# Patient Record
Sex: MALE | Race: WHITE | URBAN - METROPOLITAN AREA
[De-identification: names, ages, dates, MRNs, and addresses within clinical notes are randomized per-mention and may not be internally consistent; named-entity substitution may affect disease eponyms.]

---

## 2017-01-10 ENCOUNTER — OFFICE VISIT (OUTPATIENT)
Dept: INTERNAL MEDICINE CLINIC | Age: 42
End: 2017-01-10

## 2017-01-10 VITALS
SYSTOLIC BLOOD PRESSURE: 112 MMHG | RESPIRATION RATE: 12 BRPM | TEMPERATURE: 98.5 F | HEIGHT: 67 IN | WEIGHT: 163.6 LBS | OXYGEN SATURATION: 98 % | BODY MASS INDEX: 25.68 KG/M2 | HEART RATE: 80 BPM | DIASTOLIC BLOOD PRESSURE: 84 MMHG

## 2017-01-10 DIAGNOSIS — B02.9 HERPES ZOSTER WITHOUT COMPLICATION: Primary | ICD-10-CM

## 2017-01-10 RX ORDER — DICLOFENAC SODIUM 75 MG/1
75 TABLET, DELAYED RELEASE ORAL 2 TIMES DAILY
Qty: 60 TAB | Refills: 0 | Status: SHIPPED | OUTPATIENT
Start: 2017-01-10 | End: 2017-08-10 | Stop reason: SDUPTHER

## 2017-01-10 RX ORDER — VALACYCLOVIR HYDROCHLORIDE 1 G/1
1000 TABLET, FILM COATED ORAL 3 TIMES DAILY
Qty: 21 TAB | Refills: 0 | Status: SHIPPED | OUTPATIENT
Start: 2017-01-10 | End: 2017-01-17

## 2017-01-10 NOTE — PATIENT INSTRUCTIONS

## 2017-01-10 NOTE — PROGRESS NOTES
Krystle Levi is a 35 y.o. male who was seen in clinic today (1/10/2017). Assessment & Plan:  Esteban Lamb was seen today for rash. Diagnoses and all orders for this visit:    Herpes zoster without complication- presumptive dx, symptoms sound good, reviewed expectations. Will hold off on steroids & use NSAIDs. Red flags and expectations were reviewed & discussed with the him. He verbalized understanding.   -     valACYclovir (VALTREX) 1 gram tablet; Take 1 Tab by mouth three (3) times daily for 7 days. -     diclofenac EC (VOLTAREN) 75 mg EC tablet; Take 1 Tab by mouth two (2) times a day. Follow-up Disposition:  Return if symptoms worsen or fail to improve.       ----------------------------------------------------------------------    Subjective:  Dermatology Review  He is here to talk about pain. He noticed it 2 days ago, with gradually worsening since that time. Location: right mid/lower back and now on the R lower abdomen. Symptoms include burning & aching & tender to the touch. He denies: no trauma. He reports steroids injections for back pain in November. Treatment to date has included Diclofenac w/ some relief. Prior to Admission medications    Medication Sig Start Date End Date Taking? Authorizing Provider   fluticasone (FLONASE) 50 mcg/actuation nasal spray 2 Sprays by Both Nostrils route daily as needed for Rhinitis. Yes Historical Provider   irbesartan (AVAPRO) 150 mg tablet TAKE 1 TABLET NIGHTLY 4/19/16  Yes Evita Rhodes MD   levothyroxine (SYNTHROID) 75 mcg tablet Take 1 Tab by mouth Daily (before breakfast). 4/19/16  Yes Evita Rhodes MD   OMEPRAZOLE PO Take  by mouth daily as needed. Yes Historical Provider   Cetirizine (ZYRTEC) 10 mg Cap Take  by mouth daily as needed.    Yes Historical Provider          Allergies   Allergen Reactions    Pcn [Penicillins] Swelling           ROS- no n/v or diarrhea/constipation or fevers/chills      Objective:   Physical Exam   Abdominal: There is tenderness. Musculoskeletal:        Lumbar back: He exhibits tenderness. He exhibits no bony tenderness and no deformity. Back:          Visit Vitals    /84    Pulse 80    Temp 98.5 °F (36.9 °C) (Oral)    Resp 12    Ht 5' 7\" (1.702 m)    Wt 163 lb 9.6 oz (74.2 kg)    SpO2 98%    BMI 25.62 kg/m2         Disclaimer:  Advised him to call back or return to office if symptoms worsen/change/persist.  Discussed expected course/resolution/complications of diagnosis in detail with patient. Medication risks/benefits/costs/interactions/alternatives discussed with patient. He was given an after visit summary which includes diagnoses, current medications, & vitals. He expressed understanding with. the diagnosis and plan.         Aleksandr Matson MD

## 2017-01-10 NOTE — PROGRESS NOTES
2 nights ago developed pain right side of back radiating to the front. Noticed small rash developing last night. Worried about shingles.

## 2017-04-03 DIAGNOSIS — E06.3 ACQUIRED AUTOIMMUNE HYPOTHYROIDISM: ICD-10-CM

## 2017-04-03 RX ORDER — LEVOTHYROXINE SODIUM 75 UG/1
TABLET ORAL
Qty: 90 TAB | Refills: 0 | Status: SHIPPED | COMMUNITY
Start: 2017-04-03 | End: 2018-07-30 | Stop reason: SDUPTHER

## 2017-05-10 DIAGNOSIS — I10 ESSENTIAL HYPERTENSION, BENIGN: ICD-10-CM

## 2017-05-10 NOTE — TELEPHONE ENCOUNTER
Left detailed v/m for pt to return call, hes on the schedule next week to see Dr. Kale Woods, not sure if he is switching to him for his pcp.

## 2017-05-11 RX ORDER — IRBESARTAN 150 MG/1
TABLET ORAL
Qty: 90 TAB | Refills: 0 | Status: SHIPPED | COMMUNITY
Start: 2017-05-11 | End: 2017-05-15 | Stop reason: SDUPTHER

## 2017-05-11 NOTE — TELEPHONE ENCOUNTER
Called pt there was a error in the schedule pt now on schedule to see Dr. Gayle Castro next week on 5/15/17. Refill approved. Pt is aware.

## 2017-05-15 ENCOUNTER — OFFICE VISIT (OUTPATIENT)
Dept: INTERNAL MEDICINE CLINIC | Age: 42
End: 2017-05-15

## 2017-05-15 VITALS
RESPIRATION RATE: 16 BRPM | HEIGHT: 67 IN | WEIGHT: 161.6 LBS | SYSTOLIC BLOOD PRESSURE: 132 MMHG | DIASTOLIC BLOOD PRESSURE: 88 MMHG | OXYGEN SATURATION: 94 % | HEART RATE: 74 BPM | TEMPERATURE: 98.5 F | BODY MASS INDEX: 25.36 KG/M2

## 2017-05-15 DIAGNOSIS — I10 ESSENTIAL HYPERTENSION, BENIGN: Primary | ICD-10-CM

## 2017-05-15 DIAGNOSIS — E06.3 ACQUIRED AUTOIMMUNE HYPOTHYROIDISM: ICD-10-CM

## 2017-05-15 DIAGNOSIS — Z23 NEED FOR TDAP VACCINATION: ICD-10-CM

## 2017-05-15 DIAGNOSIS — K58.0 IRRITABLE BOWEL SYNDROME WITH DIARRHEA: ICD-10-CM

## 2017-05-15 DIAGNOSIS — E78.2 MIXED HYPERLIPIDEMIA: ICD-10-CM

## 2017-05-15 DIAGNOSIS — G89.29 CHRONIC LOW BACK PAIN WITHOUT SCIATICA, UNSPECIFIED BACK PAIN LATERALITY: ICD-10-CM

## 2017-05-15 DIAGNOSIS — M54.50 CHRONIC LOW BACK PAIN WITHOUT SCIATICA, UNSPECIFIED BACK PAIN LATERALITY: ICD-10-CM

## 2017-05-15 LAB
BILIRUB UR QL STRIP: NEGATIVE
GLUCOSE UR-MCNC: NEGATIVE MG/DL
KETONES P FAST UR STRIP-MCNC: NEGATIVE MG/DL
PH UR STRIP: 6.5 [PH] (ref 4.6–8)
PROT UR QL STRIP: NEGATIVE MG/DL
SP GR UR STRIP: 1.02 (ref 1–1.03)
UA UROBILINOGEN AMB POC: NORMAL (ref 0.2–1)
URINALYSIS CLARITY POC: CLEAR
URINALYSIS COLOR POC: YELLOW
URINE BLOOD POC: NEGATIVE
URINE LEUKOCYTES POC: NEGATIVE
URINE NITRITES POC: NEGATIVE

## 2017-05-15 RX ORDER — IRBESARTAN 150 MG/1
TABLET ORAL
Qty: 90 TAB | Refills: 3 | Status: SHIPPED | COMMUNITY
Start: 2017-05-15 | End: 2019-04-05 | Stop reason: SDUPTHER

## 2017-05-15 NOTE — PROGRESS NOTES
HISTORY OF PRESENT ILLNESS  Clara Jones is a 35 y.o. male. HPI Karyle Pelon is seen today for followup of hypothyroidism and other problems. 1. New problem reviewed. Right testicular pain. This has been present for about four days. It is consistent with epididymitis. He has used NSAIDs for treatment with some improvement. He denies any urinary symptoms. His urine dip in the office is totally normal.    2. Hypothyroidism. Overdue for routine labs. No symptoms. 3. Hypertension. Stable on current regimen. 4. Back pain. He follows up with Dr. Yue Lewis of orthopedics. He has a herniated disk, right L5-S1. He is managing this conservatively. Social History:  Notable for work being stable, very busy. His overall stress levels have improved. Past Medical History:  Notable for some IBS symptoms. He is status post a GI workup. Review of Systems   Constitutional: Negative for weight loss. Respiratory: Negative. Cardiovascular: Negative for chest pain, palpitations, leg swelling and PND. Genitourinary: Negative for dysuria and frequency. Musculoskeletal: Positive for back pain and neck pain. Negative for myalgias. Neurological: Negative for focal weakness. Physical Exam   Constitutional: No distress. Neck: Carotid bruit is not present. Cardiovascular: Normal rate and regular rhythm. Exam reveals no gallop and no friction rub. No murmur heard. Pulmonary/Chest: Effort normal and breath sounds normal. No respiratory distress. Musculoskeletal: He exhibits no edema. Nursing note and vitals reviewed. ASSESSMENT and PLAN  Albert Nathan was seen today for medication evaluation.     Diagnoses and all orders for this visit:    Essential hypertension, benign  -     Cancel: URINALYSIS W/ RFLX MICROSCOPIC  -     METABOLIC PANEL, COMPREHENSIVE  -     CBC WITH AUTOMATED DIFF  -     irbesartan (AVAPRO) 150 mg tablet; TAKE 1 TABLET NIGHTLY    Need for Tdap vaccination  -     TETANUS, DIPHTHERIA TOXOIDS AND ACELLULAR PERTUSSIS VACCINE (TDAP), IN INDIVIDS. >=7, IM    Acquired autoimmune hypothyroidism  -     TSH 3RD GENERATION    Irritable bowel syndrome with diarrhea- See gastroenterologist as directed     Chronic low back pain without sciatica, unspecified back pain laterality  -     REFERRAL TO FAMILY PRACTICE  -     AMB POC URINALYSIS DIP STICK OR TABLET REAGENT AUTO W/O MICRO    Mixed hyperlipidemia  -     LIPID PANEL    Other orders  -     Cancel: VITAMIN D, 25 HYDROXY  -     Cancel: HEMOGLOBIN A1C WITH EAG    This has been fully explained to the patient, who indicates understanding.

## 2017-05-15 NOTE — MR AVS SNAPSHOT
Visit Information Date & Time Provider Department Dept. Phone Encounter #  
 5/15/2017  4:20 PM Jericho Hernandez, [address] [facility] Internal Medicine [phone] [id number] Follow-up Instructions Return in about 1 year (around 5/15/2018). Upcoming Health Maintenance Date Due DTaP/Tdap/Td series (1 - Tdap) 12/28/1994 INFLUENZA AGE 9 TO ADULT 8/1/2017 Allergies as of 5/15/2017  Review Complete On: 5/15/2017 By: Jericho Hernandez MD  
  
 Severity Noted Reaction Type Reactions Lactose  05/15/2017    Diarrhea, Other (comments) Cramps,bloating Pcn [Penicillins]  03/15/2012    Swelling Current Immunizations  Reviewed on 5/15/2017 Name Date Influenza Vaccine 10/15/2016, 10/15/2015, 10/1/2014 Tdap  Incomplete Reviewed by Jericho Hernandez MD on 5/15/2017 at  5:12 PM  
You Were Diagnosed With   
  
 Codes Comments Essential hypertension, benign    -  Primary ICD-10-CM: I10 
ICD-9-CM: 401.1 Need for Tdap vaccination     ICD-10-CM: K75 ICD-9-CM: V06.1 Acquired autoimmune hypothyroidism     ICD-10-CM: E03.8 ICD-9-CM: 244.8 Irritable bowel syndrome with diarrhea     ICD-10-CM: K58.0 ICD-9-CM: 113.9 Chronic low back pain without sciatica, unspecified back pain laterality     ICD-10-CM: M54.5, G89.29 ICD-9-CM: 724.2, 338.29 Mixed hyperlipidemia     ICD-10-CM: E78.2 ICD-9-CM: 272.2 Vitals BP Pulse Temp Resp Height(growth percentile) Weight(growth percentile) 132/88 74 98.5 °F (36.9 °C) (Oral) 16 5' 7\" (1.702 m) 161 lb 9.6 oz (73.3 kg) SpO2 BMI Smoking Status 94% 25.31 kg/m2 Never Smoker Vitals History BMI and BSA Data Body Mass Index Body Surface Area  
 25.31 kg/m 2 1.86 m 2 Preferred Pharmacy Pharmacy Name Phone  [address] [facility] [address] [phone] Your Updated Medication List  
  
   
 This list is accurate as of: 5/15/17  5:23 PM.  Always use your most recent med list.  
  
  
  
  
 diclofenac EC 75 mg EC tablet Commonly known as:  VOLTAREN Take 1 Tab by mouth two (2) times a day. fluticasone 50 mcg/actuation nasal spray Commonly known as:  Charline Rad 2 Sprays by Both Nostrils route daily as needed for Rhinitis. irbesartan 150 mg tablet Commonly known as:  AVAPRO TAKE 1 TABLET NIGHTLY  
  
 LACTAID PO Take  by mouth. As needed for lactose intolerance OMEPRAZOLE PO Take  by mouth daily as needed. SYNTHROID 75 mcg tablet Generic drug:  levothyroxine TAKE 1 TABLET DAILY BEFORE BREAKFAST ZyrTEC 10 mg Cap Generic drug:  Cetirizine Take  by mouth daily as needed. Prescriptions Sent to Mail Order Refills  
 irbesartan (AVAPRO) 150 mg tablet 3 Sig: TAKE 1 TABLET NIGHTLY Class: Mail Order Pharmacy: [address]  #: [phone] We Performed the Following AMB POC URINALYSIS DIP STICK AUTO W/O MICRO [30194 CPT(R)] CBC WITH AUTOMATED DIFF [96633 CPT(R)] LIPID PANEL [90827 CPT(R)] METABOLIC PANEL, COMPREHENSIVE [28223 CPT(R)] REFERRAL TO FAMILY PRACTICE [[id number] Custom] Comments:  
 Please evaluate patient for evaluation of chronic neck and back pain. TETANUS, DIPHTHERIA TOXOIDS AND ACELLULAR PERTUSSIS VACCINE (TDAP), IN INDIVIDS. >=7, IM [id number] CPT(R)] TSH 3RD GENERATION [55805 CPT(R)] URINALYSIS W/ RFLX MICROSCOPIC [89509 CPT(R)] Follow-up Instructions Return in about 1 year (around 5/15/2018). Referral Information Referral ID Referred By Referred To  
  
 [id number] Miguel A EVANS, [address] [place] [address], [address] [place] [address] Road Phone: [phone] Fax: [phone] Visits Status Start Date End Date 1 New Request 5/15/17 5/15/18 If your referral has a status of pending review or denied, additional information will be sent to support the outcome of this decision. Introducing [facility] SERVICES! Dear Travis Thomas: Thank you for requesting a [employer] account. Our records indicate that you already have an active [employer] account. You can access your account anytime at [email or web address] Did you know that you can access your hospital and ER discharge instructions at any time in Postify? You can also review all of your test results from your hospital stay or ER visit. Additional Information If you have questions, please visit the Frequently Asked Questions section of the [employer] website at [email or web address] Remember, [employer] is NOT to be used for urgent needs. For medical emergencies, dial 911. Now available from your iPhone and Android! Please provide this summary of care documentation to your next provider. Your primary care clinician is listed as EBONY EVANS. If you have any questions after today's visit, please call [phone].

## 2017-05-23 LAB
CREATININE, EXTERNAL: 0.94
LDL-C, EXTERNAL: 110

## 2017-05-24 ENCOUNTER — TELEPHONE (OUTPATIENT)
Dept: INTERNAL MEDICINE CLINIC | Age: 42
End: 2017-05-24

## 2017-05-24 DIAGNOSIS — R74.8 ABNORMAL TRANSAMINASES: Primary | ICD-10-CM

## 2017-05-24 NOTE — TELEPHONE ENCOUNTER
Spoke with Kandace with [facility] - requesting to add on GTT and CPK to labs done yesterday. She advised me they do not take verbal orders - need to fax lab add on to her attn [phone]. Lab slip printed and faxed to ATTN: Kandace. Confirmation received.

## 2017-07-03 DIAGNOSIS — E06.3 ACQUIRED AUTOIMMUNE HYPOTHYROIDISM: ICD-10-CM

## 2017-07-03 RX ORDER — LEVOTHYROXINE SODIUM 75 UG/1
TABLET ORAL
Qty: 90 TAB | Refills: 3 | Status: SHIPPED | COMMUNITY
Start: 2017-07-03 | End: 2018-06-19 | Stop reason: SDUPTHER

## 2017-07-29 DIAGNOSIS — I10 ESSENTIAL HYPERTENSION, BENIGN: ICD-10-CM

## 2017-07-29 RX ORDER — IRBESARTAN 150 MG/1
TABLET ORAL
Qty: 90 TAB | Refills: 0 | Status: SHIPPED | OUTPATIENT
Start: 2017-07-29 | End: 2017-12-23 | Stop reason: SDUPTHER

## 2017-08-10 DIAGNOSIS — B02.9 HERPES ZOSTER WITHOUT COMPLICATION: ICD-10-CM

## 2017-08-10 RX ORDER — DICLOFENAC SODIUM 75 MG/1
75 TABLET, DELAYED RELEASE ORAL 2 TIMES DAILY
Qty: 30 TAB | Refills: 3 | Status: SHIPPED | OUTPATIENT
Start: 2017-08-10 | End: 2017-10-17 | Stop reason: SDUPTHER

## 2017-10-17 DIAGNOSIS — B02.9 HERPES ZOSTER WITHOUT COMPLICATION: ICD-10-CM

## 2017-10-17 RX ORDER — DICLOFENAC SODIUM 75 MG/1
TABLET, DELAYED RELEASE ORAL
Qty: 30 TAB | Refills: 2 | Status: SHIPPED | OUTPATIENT
Start: 2017-10-17 | End: 2018-04-25 | Stop reason: SDUPTHER

## 2017-10-24 ENCOUNTER — OFFICE VISIT (OUTPATIENT)
Dept: INTERNAL MEDICINE CLINIC | Age: 42
End: 2017-10-24

## 2017-10-24 VITALS
HEIGHT: 67 IN | TEMPERATURE: 98.5 F | OXYGEN SATURATION: 98 % | HEART RATE: 85 BPM | RESPIRATION RATE: 15 BRPM | DIASTOLIC BLOOD PRESSURE: 86 MMHG | BODY MASS INDEX: 25.74 KG/M2 | SYSTOLIC BLOOD PRESSURE: 125 MMHG | WEIGHT: 164 LBS

## 2017-10-24 DIAGNOSIS — I10 ESSENTIAL HYPERTENSION, BENIGN: ICD-10-CM

## 2017-10-24 DIAGNOSIS — J40 BRONCHITIS: Primary | ICD-10-CM

## 2017-10-24 DIAGNOSIS — J45.909 MILD REACTIVE AIRWAYS DISEASE, UNSPECIFIED WHETHER PERSISTENT: ICD-10-CM

## 2017-10-24 DIAGNOSIS — M54.5 RIGHT LOW BACK PAIN, UNSPECIFIED CHRONICITY, WITH SCIATICA PRESENCE UNSPECIFIED: ICD-10-CM

## 2017-10-24 RX ORDER — HYDROCODONE POLISTIREX AND CHLORPHENIRAMINE POLISTIREX 10; 8 MG/5ML; MG/5ML
5 SUSPENSION, EXTENDED RELEASE ORAL
Qty: 120 ML | Refills: 0 | Status: SHIPPED | OUTPATIENT
Start: 2017-10-24 | End: 2018-07-30

## 2017-10-24 RX ORDER — METHYLPREDNISOLONE 4 MG/1
TABLET ORAL
Qty: 1 DOSE PACK | Refills: 0 | Status: SHIPPED | OUTPATIENT
Start: 2017-10-24 | End: 2018-07-30

## 2017-10-24 RX ORDER — ALBUTEROL SULFATE 90 UG/1
2 AEROSOL, METERED RESPIRATORY (INHALATION)
Qty: 1 INHALER | Refills: 0 | Status: SHIPPED | OUTPATIENT
Start: 2017-10-24 | End: 2018-07-30

## 2017-10-24 RX ORDER — AZITHROMYCIN 250 MG/1
TABLET, FILM COATED ORAL
Qty: 6 TAB | Refills: 0 | Status: SHIPPED | OUTPATIENT
Start: 2017-10-24 | End: 2018-07-30

## 2017-10-24 NOTE — MR AVS SNAPSHOT
Visit Information Date & Time Provider Department Dept. Phone Encounter #  
 10/24/2017 11:00 AM Vielka Fernandez, [address] [facility] Internal Medicine [phone] [id number] Upcoming Health Maintenance Date Due INFLUENZA AGE 9 TO ADULT 8/1/2017 DTaP/Tdap/Td series (2 - Td) 5/15/2027 Allergies as of 10/24/2017  Review Complete On: 10/24/2017 By: Vielka Fernandez MD  
  
 Severity Noted Reaction Type Reactions Lactose  05/15/2017    Diarrhea, Other (comments) Cramps,bloating Pcn [Penicillins]  03/15/2012    Swelling Current Immunizations  Reviewed on 5/15/2017 Name Date Influenza Vaccine 10/15/2016, 10/15/2015, 10/1/2014 Tdap 5/15/2017 Not reviewed this visit You Were Diagnosed With   
  
 Codes Comments Bronchitis    -  Primary ICD-10-CM: E06 ICD-9-CM: 132 Right low back pain, unspecified chronicity, with sciatica presence unspecified     ICD-10-CM: M54.5 ICD-9-CM: 724.2 Mild reactive airways disease, unspecified whether persistent     ICD-10-CM: J45.909 ICD-9-CM: 493.90 Essential hypertension, benign     ICD-10-CM: I10 
ICD-9-CM: 401.1 Vitals BP Pulse Temp Resp Height(growth percentile) Weight(growth percentile) 125/86 85 98.5 °F (36.9 °C) (Oral) 15 5' 7\" (1.702 m) 164 lb (74.4 kg) SpO2 BMI Smoking Status 98% 25.69 kg/m2 Never Smoker Vitals History BMI and BSA Data Body Mass Index Body Surface Area  
 25.69 kg/m 2 1.88 m 2 Preferred Pharmacy Pharmacy Name Phone  [address] [place], [address] [place] [address]. [phone] Your Updated Medication List  
  
   
This list is accurate as of: 10/24/17 11:44 AM.  Always use your most recent med list.  
  
  
  
  
 albuterol 90 mcg/actuation inhaler Commonly known as:  PROVENTIL HFA, VENTOLIN HFA, PROAIR HFA Take 2 Puffs by inhalation every six (6) hours as needed for Wheezing. azithromycin 250 mg tablet Commonly known as:  Michelle Collar Take 2 tablets today, then take 1 tablet daily. chlorpheniramine-HYDROcodone 10-8 mg/5 mL suspension Commonly known as:  Annemarie Doranus Take 5 mL by mouth every twelve (12) hours as needed for Cough. Max Daily Amount: 10 mL. diclofenac EC 75 mg EC tablet Commonly known as:  VOLTAREN  
TAKE ONE TABLET BY MOUTH TWICE A DAY  
  
 fluticasone 50 mcg/actuation nasal spray Commonly known as:  Charlee Acosta 2 Sprays by Both Nostrils route daily as needed for Rhinitis. * irbesartan 150 mg tablet Commonly known as:  AVAPRO TAKE 1 TABLET NIGHTLY  
  
 * irbesartan 150 mg tablet Commonly known as:  AVAPRO TAKE 1 TABLET NIGHTLY  
  
 LACTAID PO Take  by mouth. As needed for lactose intolerance  
  
 methylPREDNISolone 4 mg tablet Commonly known as:  Valeri Self As directed NYQUIL PO Take  by mouth. OMEPRAZOLE PO Take  by mouth daily as needed. * SYNTHROID 75 mcg tablet Generic drug:  levothyroxine TAKE 1 TABLET DAILY BEFORE BREAKFAST * levothyroxine 75 mcg tablet Commonly known as:  SYNTHROID  
TAKE 1 TABLET DAILY BEFORE BREAKFAST ZyrTEC 10 mg Cap Generic drug:  Cetirizine Take  by mouth daily as needed. * Notice: This list has 4 medication(s) that are the same as other medications prescribed for you. Read the directions carefully, and ask your doctor or other care provider to review them with you. Prescriptions Printed Refills  
 chlorpheniramine-HYDROcodone (TUSSIONEX) 10-8 mg/5 mL suspension 0 Sig: Take 5 mL by mouth every twelve (12) hours as needed for Cough. Max Daily Amount: 10 mL. Class: Print Route: Oral  
  
Prescriptions Sent to Pharmacy Refills  
 methylPREDNISolone (MEDROL DOSEPACK) 4 mg tablet 0 Sig: As directed Class: Normal  
 Pharmacy: [facility] [address], [address] [place] [address] RD. Ph #: [phone] azithromycin (ZITHROMAX) 250 mg tablet 0 Sig: Take 2 tablets today, then take 1 tablet daily. Class: Normal  
 Pharmacy: [facility] [address], [address] [facility] [address] Ph #: [phone]  
 albuterol (PROVENTIL HFA, VENTOLIN HFA, PROAIR HFA) 90 mcg/actuation inhaler 0 Sig: Take 2 Puffs by inhalation every six (6) hours as needed for Wheezing. Class: Normal  
 Pharmacy: [facility] [address], [address] [facility] [address] Ph #: [phone] Route: Inhalation Patient Instructions Z tg as directed Medrol Dose pack Proventil Inhaler 2 puffs every 6 hours as needed Tussionex at bedtime if needed for cough Call or return to clinic prn if these symptoms worsen or fail to improve as anticipated. Introducing [address]! Dear Sindi Rosa: Thank you for requesting a [employer] account. Our records indicate that you already have an active [employer] account. You can access your account anytime at [email or web address] Did you know that you can access your hospital and ER discharge instructions at any time in [employer]? You can also review all of your test results from your hospital stay or ER visit. Additional Information If you have questions, please visit the Frequently Asked Questions section of the [employer] website at [email or web address] Remember, [employer] is NOT to be used for urgent needs. For medical emergencies, dial 911. Now available from your iPhone and Android! Please provide this summary of care documentation to your next provider. Your primary care clinician is listed as EBONY EVANS. If you have any questions after today's visit, please call [phone].

## 2017-10-24 NOTE — PROGRESS NOTES
HISTORY OF PRESENT ILLNESS  Lisbeth Yoo is a 35 y.o. male. HPI Patient complains of 4 weeks of upper respiratory symptoms. He initially had a sore throat, with nasal congestion. Subsequently he developed cough and chest congestion with green sputum production. He denies shortness of breath, but has some tightness with breathing and coughing. He denies fevers or chills. His cough is worse at night, and has been keeping him and his wife up. He initially tried using Flonase, without relief  Non smoker and no  history of asthma. He also has been having right lower back pain, to his right buttocks. He has had lumbar disc issues in the past and has seen Ortho. Symptoms flared after camping this weekend with his daughter. He has taken Medrol dose packs in the past with relief. He denies radiation of pain to his leg, numbness or weakness. He also has been trying to do stretching exercises for his piriformis muscle tightness. Past Medical History:   Diagnosis Date    Allergic rhinitis     Allergic rhinitis, cause unspecified 3/15/2012    Essential hypertension, benign 1/20/2014    Hypertension     Proteinuria     Unspecified hypothyroidism 3/20/2012     Current Outpatient Prescriptions on File Prior to Visit   Medication Sig Dispense Refill    diclofenac EC (VOLTAREN) 75 mg EC tablet TAKE ONE TABLET BY MOUTH TWICE A DAY 30 Tab 2    irbesartan (AVAPRO) 150 mg tablet TAKE 1 TABLET NIGHTLY 90 Tab 0    levothyroxine (SYNTHROID) 75 mcg tablet TAKE 1 TABLET DAILY BEFORE BREAKFAST 90 Tab 3    LACTASE (LACTAID PO) Take  by mouth. As needed for lactose intolerance      irbesartan (AVAPRO) 150 mg tablet TAKE 1 TABLET NIGHTLY 90 Tab 3    SYNTHROID 75 mcg tablet TAKE 1 TABLET DAILY BEFORE BREAKFAST 90 Tab 0    fluticasone (FLONASE) 50 mcg/actuation nasal spray 2 Sprays by Both Nostrils route daily as needed for Rhinitis.  OMEPRAZOLE PO Take  by mouth daily as needed.       Cetirizine (ZYRTEC) 10 mg Cap Take by mouth daily as needed. No current facility-administered medications on file prior to visit. Allergies   Allergen Reactions    Lactose Diarrhea and Other (comments)     Cramps,bloating    Pcn [Penicillins] Swelling     ROS  Per HPI  Physical Exam  Visit Vitals    /86    Pulse 85    Temp 98.5 °F (36.9 °C) (Oral)    Resp 15    Ht 5' 7\" (1.702 m)    Wt 164 lb (74.4 kg)    SpO2 98%    BMI 25.69 kg/m2     HEENT: normocephalic, extraocular movements intact, conjunctiva clear  Oropharynx: clear. No erythema, exudate, or drainage  Nose: no drainage  Sinuses: nontender  Ears: tympanic membrane's and canals normal.  No erythema, fluid, drainage  Neck: no adenopathy   Heart[de-identified] normal rate, regular rhythm, normal S1, S2, no murmurs, rubs, clicks or gallops. Chest: scattered end expiratory wheezes, no rales or rhonchi  Back no spinal or paraspinal tenderness  Extremities: no edema  Neuro: normal strength and sensation  straight leg-raise right positive for buttocks pain at about 60 degrees  ASSESSMENT and PLAN  Asthmatic Bronchitis   Z tg as directed  Medrol Dose pack  Proventil Inhaler 2 puffs every 6 hours as needed  Tussionex at bedtime if needed for cough  Back and right buttocks pain: Medrol dose pack as above, and patient to continue with stretching exercises and will call or return to clinic if these symptoms worsen or fail to improve as anticipated. HTN: controlled on medication    Follow-up Disposition:  Return if symptoms worsen or fail to improve. Advised to call back or return to office if symptoms worsen/change/persist.  Discussed expected course/resolution/complications of diagnosis in detail with patient. Medication risks/benefits/costs/interactions/alternatives discussed with patient. He was given an after visit summary which includes diagnoses, current medications, & vitals. He expressed understanding with the diagnosis and plan.

## 2017-10-24 NOTE — PATIENT INSTRUCTIONS
GHASSAN mas as directed  Medrol Dose pack  Proventil Inhaler 2 puffs every 6 hours as needed  Tussionex at bedtime if needed for cough  Call or return to clinic prn if these symptoms worsen or fail to improve as anticipated.

## 2017-12-23 DIAGNOSIS — I10 ESSENTIAL HYPERTENSION, BENIGN: ICD-10-CM

## 2017-12-23 RX ORDER — IRBESARTAN 150 MG/1
TABLET ORAL
Qty: 90 TAB | Refills: 0 | Status: SHIPPED | COMMUNITY
Start: 2017-12-23 | End: 2018-04-25 | Stop reason: SDUPTHER

## 2018-04-25 DIAGNOSIS — I10 ESSENTIAL HYPERTENSION, BENIGN: ICD-10-CM

## 2018-04-25 DIAGNOSIS — B02.9 HERPES ZOSTER WITHOUT COMPLICATION: ICD-10-CM

## 2018-04-25 RX ORDER — IRBESARTAN 150 MG/1
TABLET ORAL
Qty: 90 TAB | Refills: 0 | Status: SHIPPED | COMMUNITY
Start: 2018-04-25 | End: 2018-07-24 | Stop reason: SDUPTHER

## 2018-04-25 RX ORDER — DICLOFENAC SODIUM 75 MG/1
TABLET, DELAYED RELEASE ORAL
Qty: 30 TAB | Refills: 2 | Status: SHIPPED | OUTPATIENT
Start: 2018-04-25 | End: 2018-06-14 | Stop reason: SDUPTHER

## 2018-06-14 DIAGNOSIS — B02.9 HERPES ZOSTER WITHOUT COMPLICATION: ICD-10-CM

## 2018-06-14 RX ORDER — DICLOFENAC SODIUM 75 MG/1
TABLET, DELAYED RELEASE ORAL
Qty: 30 TAB | Refills: 1 | Status: SHIPPED | OUTPATIENT
Start: 2018-06-14 | End: 2018-07-30 | Stop reason: SDUPTHER

## 2018-06-19 DIAGNOSIS — E06.3 ACQUIRED AUTOIMMUNE HYPOTHYROIDISM: ICD-10-CM

## 2018-06-19 RX ORDER — LEVOTHYROXINE SODIUM 75 UG/1
TABLET ORAL
Qty: 30 TAB | Refills: 0 | Status: SHIPPED | OUTPATIENT
Start: 2018-06-19 | End: 2018-07-30

## 2018-07-24 DIAGNOSIS — I10 ESSENTIAL HYPERTENSION, BENIGN: ICD-10-CM

## 2018-07-24 RX ORDER — IRBESARTAN 150 MG/1
TABLET ORAL
Qty: 90 TAB | Refills: 0 | Status: SHIPPED | OUTPATIENT
Start: 2018-07-24 | End: 2018-07-30

## 2018-07-30 ENCOUNTER — OFFICE VISIT (OUTPATIENT)
Dept: INTERNAL MEDICINE CLINIC | Age: 43
End: 2018-07-30

## 2018-07-30 VITALS
HEIGHT: 67 IN | HEART RATE: 72 BPM | BODY MASS INDEX: 25.68 KG/M2 | RESPIRATION RATE: 20 BRPM | DIASTOLIC BLOOD PRESSURE: 85 MMHG | WEIGHT: 163.6 LBS | TEMPERATURE: 98.1 F | SYSTOLIC BLOOD PRESSURE: 122 MMHG | OXYGEN SATURATION: 98 %

## 2018-07-30 DIAGNOSIS — Z00.00 ROUTINE GENERAL MEDICAL EXAMINATION AT A HEALTH CARE FACILITY: ICD-10-CM

## 2018-07-30 DIAGNOSIS — G89.29 CHRONIC LOW BACK PAIN WITHOUT SCIATICA, UNSPECIFIED BACK PAIN LATERALITY: Primary | ICD-10-CM

## 2018-07-30 DIAGNOSIS — F41.1 ANXIETY STATE: ICD-10-CM

## 2018-07-30 DIAGNOSIS — E06.3 ACQUIRED AUTOIMMUNE HYPOTHYROIDISM: ICD-10-CM

## 2018-07-30 DIAGNOSIS — M54.50 CHRONIC LOW BACK PAIN WITHOUT SCIATICA, UNSPECIFIED BACK PAIN LATERALITY: Primary | ICD-10-CM

## 2018-07-30 RX ORDER — DICLOFENAC SODIUM 75 MG/1
TABLET, DELAYED RELEASE ORAL
Qty: 60 TAB | Refills: 6 | Status: SHIPPED | OUTPATIENT
Start: 2018-07-30 | End: 2018-09-26 | Stop reason: ALTCHOICE

## 2018-07-30 RX ORDER — LEVOTHYROXINE SODIUM 75 UG/1
TABLET ORAL
Qty: 90 TAB | Refills: 1 | Status: SHIPPED | COMMUNITY
Start: 2018-07-30 | End: 2019-01-05 | Stop reason: SDUPTHER

## 2018-07-30 NOTE — MR AVS SNAPSHOT
[facility] [address] [place] [address] 57 
[phone] Patient: Charlotte Vargas MRN: [id number] :[id number] Visit Information Date & Time Provider Department Dept. Phone Encounter #  
 2018  4:35 PM Stephanie Sanchez, [address] [facility] Internal Medicine [facility] 2303 Follow-up Instructions Return in about 6 months (around 2019). Upcoming Health Maintenance Date Due Influenza Age 5 to Adult 2018 DTaP/Tdap/Td series (2 - Td) 5/15/2027 Allergies as of 2018  Review Complete On: 2018 By: Zeyad Oswald LPN Severity Noted Reaction Type Reactions Lactose  05/15/2017    Diarrhea, Other (comments) Cramps,bloating Pcn [Penicillins]  03/15/2012    Swelling Current Immunizations  Reviewed on 5/15/2017 Name Date Influenza Vaccine 10/15/2016, 10/15/2015, 10/1/2014 Tdap 5/15/2017 Not reviewed this visit You Were Diagnosed With   
  
 Codes Comments Chronic low back pain without sciatica, unspecified back pain laterality    -  Primary ICD-10-CM: M54.5, G89.29 ICD-9-CM: 724.2, 338.29 Acquired autoimmune hypothyroidism     ICD-10-CM: E06.3 ICD-9-CM: 244.8 Anxiety state     ICD-10-CM: F41.1 ICD-9-CM: 300.00 Routine general medical examination at a health care facility     ICD-10-CM: Z00.00 ICD-9-CM: V70.0 Vitals BP Pulse Temp Resp Height(growth percentile) Weight(growth percentile) 122/85 72 98.1 °F (36.7 °C) 20 5' 7\" (1.702 m) 163 lb 9.6 oz (74.2 kg) SpO2 BMI Smoking Status 98% 25.62 kg/m2 Never Smoker BMI and BSA Data Body Mass Index Body Surface Area  
 25.62 kg/m 2 1.87 m 2 Preferred Pharmacy Pharmacy Name Phone [facility], [address] [place] [address]. [phone] Your Updated Medication List  
  
   
 This list is accurate as of 7/30/18  5:35 PM.  Always use your most recent med list.  
  
  
  
  
 diclofenac EC 75 mg EC tablet Commonly known as:  VOLTAREN  
TAKE 1 TABLET BY MOUTH TWICE DAILY  
  
 fluticasone 50 mcg/actuation nasal spray Commonly known as:  Christy Fetch 2 Sprays by Both Nostrils route daily as needed for Rhinitis. irbesartan 150 mg tablet Commonly known as:  AVAPRO TAKE 1 TABLET NIGHTLY  
  
 LACTAID PO Take  by mouth. As needed for lactose intolerance  
  
 levothyroxine 75 mcg tablet Commonly known as:  SYNTHROID  
TAKE 1 TABLET DAILY BEFORE BREAKFAST ZyrTEC 10 mg Cap Generic drug:  Cetirizine Take  by mouth daily as needed. Prescriptions Sent to Mail Order Refills  
 levothyroxine (SYNTHROID) 75 mcg tablet 1 Sig: TAKE 1 TABLET DAILY BEFORE BREAKFAST Class: Mail Order Pharmacy: [address] [facility] [place] Ph #: [phone] Prescriptions Sent to Pharmacy Refills  
 diclofenac EC (VOLTAREN) 75 mg EC tablet 6 Sig: TAKE 1 TABLET BY MOUTH TWICE DAILY Class: Normal  
 Pharmacy: [address] Dr Odonnell, [address] [place] [address] RD. Ph #: [phone] We Performed the Following CBC WITH AUTOMATED DIFF [31862 CPT(R)] LIPID PANEL [49744 CPT(R)] METABOLIC PANEL, COMPREHENSIVE [40245 CPT(R)] REFERRAL TO PHYSICIAL MEDICINE REHAB [BKJ78 Custom] TSH 3RD GENERATION [36601 CPT(R)] URINALYSIS W/ RFLX MICROSCOPIC [71413 CPT(R)] Follow-up Instructions Return in about 6 months (around 1/30/2019). Referral Information Referral ID Referred By Referred To  
  
 [id number] Ernestina EVANS MD   
   [address] [facility] [place], [address] Phone: [phone] Fax: [phone] Visits Status Start Date End Date 1 New Request 7/30/18 7/30/19 If your referral has a status of pending review or denied, additional information will be sent to support the outcome of this decision. Introducing [facility] SERVICES! Dear Rudi Kawasaki: Thank you for requesting a [employer] account. Our records indicate that you already have an active [employer] account. You can access your account anytime at [email or web address] Did you know that you can access your hospital and ER discharge instructions at any time in [employer]? You can also review all of your test results from your hospital stay or ER visit. Additional Information If you have questions, please visit the Frequently Asked Questions section of the [employer] website at [email or web address] Remember, [employer] is NOT to be used for urgent needs. For medical emergencies, dial 911. Now available from your iPhone and Android! Please provide this summary of care documentation to your next provider. Your primary care clinician is listed as EBONY EVANS. If you have any questions after today's visit, please call [phone].

## 2018-07-30 NOTE — PROGRESS NOTES
HISTORY OF PRESENT ILLNESS  Sanchez Piña is a 38 y.o. male. REBECA Taylor is seen today for follow up of hypothyroidism, low back pain and other problems. 1. Hypothyroidism. Due for routine lab check. 2. Low back pain. He has been diagnosed with lumbar disc disease. He is managing with conservative measures. He has primarily been to orthopedic specialists. I suggested possibly a PMR consultation to help with nonsurgical management of his back pain. He likes this approach and I have provided him with some names to schedule. 3. Hypertension. Stable on current regimen. 4. Anxiety. Doing fine off treatment. Social history notable for his son graduating high school and is leaving for [employer]. This is causing some natural anxiety for Joo and his wife. He has three kids left in lower grades. Review of Systems   Constitutional: Negative for weight loss. Respiratory: Negative. Cardiovascular: Negative for chest pain, palpitations, leg swelling and PND. Musculoskeletal: Positive for back pain. Negative for myalgias. Neurological: Positive for tingling and sensory change. Negative for focal weakness. Physical Exam   Constitutional: No distress. Neck: Carotid bruit is not present. Cardiovascular: Normal rate and regular rhythm. Exam reveals no gallop and no friction rub. No murmur heard. Pulmonary/Chest: Effort normal and breath sounds normal. No respiratory distress. Musculoskeletal: He exhibits no edema. Nursing note and vitals reviewed. ASSESSMENT and PLAN  Diagnoses and all orders for this visit:    1. Chronic low back pain without sciatica, unspecified back pain laterality  -     diclofenac EC (VOLTAREN) 75 mg EC tablet; TAKE 1 TABLET BY MOUTH TWICE DAILY  -     REFERRAL TO PHYSICIAL MEDICINE REHAB    2. Acquired autoimmune hypothyroidism  -     levothyroxine (SYNTHROID) 75 mcg tablet; TAKE 1 TABLET DAILY BEFORE BREAKFAST    3.  Anxiety state- Follow off treatment 4. Routine general medical examination at a health care facility  -     COMP METABOLIC PANEL  -     CBC W/ AUTOMATED DIFF  -     LIPID PANEL  -     TSH  -     URINALYSIS W/ REFLEX MICRO

## 2018-08-31 ENCOUNTER — TELEPHONE (OUTPATIENT)
Dept: INTERNAL MEDICINE CLINIC | Age: 43
End: 2018-08-31

## 2018-08-31 NOTE — TELEPHONE ENCOUNTER
As requested - faxed pt's last 3 office notes to [address] [place] [address] Dr Gonsalo Freeman [phone]. No current labs available to send. Confirmation received.

## 2018-08-31 NOTE — TELEPHONE ENCOUNTER
dr Freddy Swenson [phone]     fax [phone] (f)    Calling again for last two or three office notes and labs on this pt. Pt is now there for appt.

## 2018-09-26 RX ORDER — MELOXICAM 15 MG/1
15 TABLET ORAL
Qty: 30 TAB | Refills: 3 | Status: SHIPPED | OUTPATIENT
Start: 2018-09-26 | End: 2019-04-24 | Stop reason: SDUPTHER

## 2019-01-05 DIAGNOSIS — E06.3 ACQUIRED AUTOIMMUNE HYPOTHYROIDISM: ICD-10-CM

## 2019-01-07 RX ORDER — LEVOTHYROXINE SODIUM 75 UG/1
TABLET ORAL
Qty: 30 TAB | Refills: 0 | Status: SHIPPED | OUTPATIENT
Start: 2019-01-07 | End: 2019-01-31 | Stop reason: SDUPTHER

## 2019-01-26 DIAGNOSIS — E06.3 ACQUIRED AUTOIMMUNE HYPOTHYROIDISM: ICD-10-CM

## 2019-01-27 RX ORDER — LEVOTHYROXINE SODIUM 75 UG/1
TABLET ORAL
Qty: 30 TAB | Refills: 0 | OUTPATIENT
Start: 2019-01-27

## 2019-01-31 DIAGNOSIS — E06.3 ACQUIRED AUTOIMMUNE HYPOTHYROIDISM: ICD-10-CM

## 2019-01-31 RX ORDER — LEVOTHYROXINE SODIUM 75 UG/1
TABLET ORAL
Qty: 30 TAB | Refills: 0 | Status: SHIPPED | OUTPATIENT
Start: 2019-01-31 | End: 2020-01-20 | Stop reason: SDUPTHER

## 2019-01-31 NOTE — TELEPHONE ENCOUNTER
Advised pt MD has sent in one month. He needs to have labs done - printed and at . Also needs to schedule follow up as due now. He will call back to schedule.

## 2019-03-04 ENCOUNTER — OFFICE VISIT (OUTPATIENT)
Dept: INTERNAL MEDICINE CLINIC | Age: 44
End: 2019-03-04

## 2019-03-04 VITALS
DIASTOLIC BLOOD PRESSURE: 78 MMHG | HEART RATE: 74 BPM | SYSTOLIC BLOOD PRESSURE: 106 MMHG | WEIGHT: 168.5 LBS | BODY MASS INDEX: 26.45 KG/M2 | HEIGHT: 67 IN | RESPIRATION RATE: 18 BRPM | OXYGEN SATURATION: 96 % | TEMPERATURE: 98 F

## 2019-03-04 DIAGNOSIS — M54.50 CHRONIC LOW BACK PAIN WITHOUT SCIATICA, UNSPECIFIED BACK PAIN LATERALITY: ICD-10-CM

## 2019-03-04 DIAGNOSIS — G89.29 CHRONIC LOW BACK PAIN WITHOUT SCIATICA, UNSPECIFIED BACK PAIN LATERALITY: ICD-10-CM

## 2019-03-04 DIAGNOSIS — E06.3 ACQUIRED AUTOIMMUNE HYPOTHYROIDISM: Primary | ICD-10-CM

## 2019-03-04 DIAGNOSIS — I10 ESSENTIAL HYPERTENSION, BENIGN: ICD-10-CM

## 2019-03-04 NOTE — PROGRESS NOTES
HISTORY OF PRESENT ILLNESS Lillie Caban is a 37 y.o. male. HPI Subjective:  Luwana Cooks is seen today for follow up of hypothyroidism and hypertension. He is doing well. 1. Hypothyroidism. He is overdue for routine labs. We will check labs and adjust his medications as needed. 2. Hypertension, stable on current regimen. Review of Systems:  Notable for a recent bout of flu that resolved. He has had improvement in his chronic back pain with physical therapy and a solid stretching regimen. He was very pleased with the PT he received. Review of Systems Constitutional: Negative for weight loss. Respiratory: Negative. Cardiovascular: Negative for chest pain, palpitations, leg swelling and PND. Musculoskeletal: Positive for back pain. Negative for myalgias. Neurological: Negative for focal weakness. Physical Exam  
Constitutional: No distress. Neck: Carotid bruit is not present. Cardiovascular: Normal rate and regular rhythm. Exam reveals no gallop and no friction rub. No murmur heard. Pulmonary/Chest: Effort normal and breath sounds normal. No respiratory distress. Musculoskeletal: He exhibits no edema. Nursing note and vitals reviewed. ASSESSMENT and PLAN Diagnoses and all orders for this visit: 
 
1. Acquired autoimmune hypothyroidism- check labs 2. Essential hypertension, benign- Continue current regimen of prescription and / or OTC medications 3. Chronic low back pain without sciatica, unspecified back pain laterality- Proceed with plan as discussed

## 2019-04-04 DIAGNOSIS — I10 ESSENTIAL HYPERTENSION, BENIGN: ICD-10-CM

## 2019-04-05 RX ORDER — IRBESARTAN 150 MG/1
TABLET ORAL
Qty: 90 TAB | Refills: 3 | Status: SHIPPED | OUTPATIENT
Start: 2019-04-05 | End: 2020-01-17 | Stop reason: SDUPTHER

## 2019-04-24 RX ORDER — MELOXICAM 15 MG/1
TABLET ORAL
Qty: 30 TAB | Refills: 2 | Status: SHIPPED | OUTPATIENT
Start: 2019-04-24 | End: 2019-11-20 | Stop reason: SDUPTHER

## 2019-11-11 DIAGNOSIS — E06.3 ACQUIRED AUTOIMMUNE HYPOTHYROIDISM: Primary | ICD-10-CM

## 2019-11-20 RX ORDER — MELOXICAM 15 MG/1
TABLET ORAL
Qty: 90 TAB | Refills: 0 | Status: SHIPPED | OUTPATIENT
Start: 2019-11-20 | End: 2020-05-18 | Stop reason: SDUPTHER

## 2019-11-26 ENCOUNTER — OFFICE VISIT (OUTPATIENT)
Dept: INTERNAL MEDICINE CLINIC | Age: 44
End: 2019-11-26

## 2019-11-26 VITALS
SYSTOLIC BLOOD PRESSURE: 129 MMHG | RESPIRATION RATE: 16 BRPM | BODY MASS INDEX: 25.49 KG/M2 | HEIGHT: 67 IN | WEIGHT: 162.4 LBS | TEMPERATURE: 98.4 F | OXYGEN SATURATION: 97 % | HEART RATE: 76 BPM | DIASTOLIC BLOOD PRESSURE: 85 MMHG

## 2019-11-26 DIAGNOSIS — E06.3 ACQUIRED AUTOIMMUNE HYPOTHYROIDISM: Primary | ICD-10-CM

## 2019-11-26 DIAGNOSIS — I10 ESSENTIAL HYPERTENSION, BENIGN: ICD-10-CM

## 2019-11-26 DIAGNOSIS — F41.1 ANXIETY STATE: ICD-10-CM

## 2019-11-26 DIAGNOSIS — Z23 ENCOUNTER FOR IMMUNIZATION: ICD-10-CM

## 2019-11-26 DIAGNOSIS — R53.82 CHRONIC FATIGUE: ICD-10-CM

## 2019-11-26 RX ORDER — ESCITALOPRAM OXALATE 10 MG/1
10 TABLET ORAL DAILY
Qty: 30 TAB | Refills: 3 | Status: SHIPPED | OUTPATIENT
Start: 2019-11-26 | End: 2020-03-28

## 2019-11-26 NOTE — PROGRESS NOTES
Pt states has been feeling fatigued and anxious approx 6 months. Wanted to have thyroid labs check due to prior to feeling same back in 2012. Recently has increased past several weeks.

## 2019-11-26 NOTE — PROGRESS NOTES
HISTORY OF PRESENT ILLNESS  Lopez Wilkins is a 37 y.o. male. HPI Subjective:  Regino Dempsey is seen today for follow up of hypothyroidism and evaluation of fatigue. 1. Fatigue. This has been present three to six months, feels very similar to when he was diagnosed with hypothyroidism. He had also had recurrent dysthymia symptoms. He describes having some anxiety and a poor sleep pattern. We checked his thyroid levels and these were acceptable. He does admit to the possibility of recurrent dysthymia. Lifestyle issues include a crazy work schedule, including one weekend a month up in [place] [employer]. This has been stable for him, but is very demanding. He did have apnea diagnosed and it was very mild. Review of Systems:  Notable for some GERD-like upper chest pain that occurs at rest.  He has no exertional chest pains or any cardiac symptom whatsoever. We reviewed testing for testosterone and if this is okay he will restart Lexapro. Will have him follow up in one month. Past Medical History:   Diagnosis Date    Allergic rhinitis     Allergic rhinitis, cause unspecified 3/15/2012    Essential hypertension, benign 1/20/2014    Hypertension     Proteinuria     Unspecified hypothyroidism 3/20/2012         Review of Systems   Constitutional: Positive for malaise/fatigue. Musculoskeletal: Positive for back pain. Psychiatric/Behavioral: Positive for depression. The patient is nervous/anxious. Physical Exam  Vitals signs and nursing note reviewed. Constitutional:       General: He is not in acute distress. Neck:      Vascular: No carotid bruit. Cardiovascular:      Rate and Rhythm: Normal rate and regular rhythm. Heart sounds: No murmur. No friction rub. No gallop. Pulmonary:      Effort: Pulmonary effort is normal. No respiratory distress. Breath sounds: Normal breath sounds. ASSESSMENT and PLAN  Diagnoses and all orders for this visit:    1. Acquired autoimmune hypothyroidism- Continue current regimen of prescription and / or OTC medications     2. Encounter for immunization    3. Essential hypertension, benign- Continue current regimen of prescription and / or OTC medications     4. Anxiety state  -     escitalopram oxalate (LEXAPRO) 10 mg tablet; Take 1 Tab by mouth daily.     5. Chronic fatigue  -     TESTOSTERONE, FREE & TOTAL  -     CBC WITH AUTOMATED DIFF  -     METABOLIC PANEL, COMPREHENSIVE

## 2020-01-17 DIAGNOSIS — I10 ESSENTIAL HYPERTENSION, BENIGN: ICD-10-CM

## 2020-01-20 ENCOUNTER — TELEPHONE (OUTPATIENT)
Dept: INTERNAL MEDICINE CLINIC | Age: 45
End: 2020-01-20

## 2020-01-20 RX ORDER — IRBESARTAN 150 MG/1
TABLET ORAL
Qty: 90 TAB | Refills: 3 | Status: SHIPPED | OUTPATIENT
Start: 2020-01-20 | End: 2021-01-11

## 2020-03-28 DIAGNOSIS — F41.1 ANXIETY STATE: ICD-10-CM

## 2020-03-28 RX ORDER — ESCITALOPRAM OXALATE 10 MG/1
TABLET ORAL
Qty: 30 TAB | Refills: 2 | Status: SHIPPED | OUTPATIENT
Start: 2020-03-28 | End: 2020-03-31 | Stop reason: SDUPTHER

## 2020-03-31 DIAGNOSIS — F41.1 ANXIETY STATE: ICD-10-CM

## 2020-04-01 RX ORDER — ESCITALOPRAM OXALATE 10 MG/1
TABLET ORAL
Qty: 30 TAB | Refills: 2 | Status: SHIPPED | OUTPATIENT
Start: 2020-04-01 | End: 2020-10-07 | Stop reason: SDUPTHER

## 2020-05-18 RX ORDER — MELOXICAM 15 MG/1
TABLET ORAL
Qty: 90 TAB | Refills: 0 | Status: CANCELLED | OUTPATIENT
Start: 2020-05-18

## 2020-05-18 NOTE — TELEPHONE ENCOUNTER
Requested Prescriptions     Pending Prescriptions Disp Refills    meloxicam (MOBIC) 15 mg tablet 90 Tab 0     Sig: TAKE ONE TABLET BY MOUTH DAILY AS NEEDED FOR PAIN

## 2020-05-19 RX ORDER — MELOXICAM 15 MG/1
TABLET ORAL
Qty: 90 TAB | Refills: 0 | Status: SHIPPED | OUTPATIENT
Start: 2020-05-19 | End: 2020-10-16

## 2020-10-22 ENCOUNTER — VIRTUAL VISIT (OUTPATIENT)
Dept: INTERNAL MEDICINE CLINIC | Age: 45
End: 2020-10-22

## 2020-10-22 DIAGNOSIS — I10 ESSENTIAL HYPERTENSION, BENIGN: ICD-10-CM

## 2020-10-22 DIAGNOSIS — E06.3 ACQUIRED AUTOIMMUNE HYPOTHYROIDISM: Primary | ICD-10-CM

## 2020-10-22 DIAGNOSIS — Z00.00 LABORATORY TESTS ORDERED AS PART OF A COMPLETE PHYSICAL EXAM (CPE): ICD-10-CM

## 2020-10-22 DIAGNOSIS — F41.1 ANXIETY STATE: ICD-10-CM

## 2020-10-22 DIAGNOSIS — M54.50 CHRONIC LOW BACK PAIN WITHOUT SCIATICA, UNSPECIFIED BACK PAIN LATERALITY: ICD-10-CM

## 2020-10-22 DIAGNOSIS — G89.29 CHRONIC LOW BACK PAIN WITHOUT SCIATICA, UNSPECIFIED BACK PAIN LATERALITY: ICD-10-CM

## 2020-10-22 PROCEDURE — 99214 OFFICE O/P EST MOD 30 MIN: CPT | Performed by: INTERNAL MEDICINE

## 2020-10-22 RX ORDER — ESCITALOPRAM OXALATE 10 MG/1
TABLET ORAL
Qty: 90 TAB | Refills: 3 | Status: SHIPPED | OUTPATIENT
Start: 2020-10-22 | End: 2021-11-21

## 2020-10-22 RX ORDER — CYCLOBENZAPRINE HCL 10 MG
10 TABLET ORAL
Qty: 30 TAB | Refills: 3 | Status: SHIPPED | OUTPATIENT
Start: 2020-10-22

## 2020-10-22 RX ORDER — CYCLOBENZAPRINE HCL 10 MG
10 TABLET ORAL
COMMUNITY
End: 2020-10-22 | Stop reason: SDUPTHER

## 2020-10-22 NOTE — PROGRESS NOTES
HISTORY OF PRESENT ILLNESS  Devante Watkins is a 53 y.o. male. This is a real-time audio/video visit brought to you by our sponsors, the fine folks at [facility] and [employer] platform   HPI Herman Bernheim is seen today for follow up of hypothyroidism and other concerns. 1. Hypothyroidism. He is overdue for labs. We will mail him a lab form. 2. Anxiety. He had been off Lexapro for a few weeks which led to worsened anxiety. He is pleased with the effects of the medication and is feeling better now that he has resumed it. 3. Lumbar disc disease. Symptoms wax and wane. He treats these conservatively. 4. Hypertension. Stable on current regimen. 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 Once a month he will moonlight over weekends in [place]. Current Outpatient Medications   Medication Sig    cyclobenzaprine (FLEXERIL) 10 mg tablet Take 1 Tab by mouth three (3) times daily as needed for Muscle Spasm(s).  escitalopram oxalate (LEXAPRO) 10 mg tablet TAKE ONE TABLET BY MOUTH DAILY    meloxicam (MOBIC) 15 mg tablet TAKE ONE TABLET BY MOUTH  DAILY AS NEEDED FOR PAIN    levothyroxine (SYNTHROID) 75 mcg tablet TAKE 1 TABLET DAILY BEFORE BREAKFAST    irbesartan (AVAPRO) 150 mg tablet TAKE 1 TABLET NIGHTLY    LACTASE (LACTAID PO) Take  by mouth. As needed for lactose intolerance    fluticasone (FLONASE) 50 mcg/actuation nasal spray 2 Sprays by Both Nostrils route daily as needed for Rhinitis.  Cetirizine (ZYRTEC) 10 mg Cap Take  by mouth daily as needed. No current facility-administered medications for this visit. Review of Systems   Constitutional: Negative for weight loss. Respiratory: Negative. Cardiovascular: Negative for chest pain, palpitations, leg swelling and PND. Musculoskeletal: Negative for myalgias. Skin: Positive for rash.         Skin lesion, some increase over the - left shoulder Neurological: Negative for focal weakness. Physical Exam  Vitals signs and nursing note reviewed. Constitutional:       Appearance: He is not ill-appearing. Skin:     General: Skin is warm and dry. Neurological:      Mental Status: He is alert. Psychiatric:         Behavior: Behavior normal.         ASSESSMENT and PLAN  Diagnoses and all orders for this visit:    1. Acquired autoimmune hypothyroidism  -     TSH 3RD GENERATION    2. Anxiety state  -     escitalopram oxalate (LEXAPRO) 10 mg tablet; TAKE ONE TABLET BY MOUTH DAILY    3. Essential hypertension, benign    4. Chronic low back pain without sciatica, unspecified back pain laterality  -     cyclobenzaprine (FLEXERIL) 10 mg tablet; Take 1 Tab by mouth three (3) times daily as needed for Muscle Spasm(s).     5. Laboratory tests ordered as part of a complete physical exam (CPE)  -     METABOLIC PANEL, COMPREHENSIVE  -     CBC WITH AUTOMATED DIFF  -     LIPID PANEL  -     URINALYSIS W/ RFLX MICROSCOPIC  -     PSA SCREENING (SCREENING)

## 2020-12-06 ENCOUNTER — TELEPHONE (OUTPATIENT)
Dept: INTERNAL MEDICINE CLINIC | Age: 45
End: 2020-12-06

## 2020-12-06 DIAGNOSIS — E78.2 MIXED HYPERLIPIDEMIA: Primary | ICD-10-CM

## 2020-12-06 DIAGNOSIS — R97.20 INCREASED PROSTATE SPECIFIC ANTIGEN (PSA) VELOCITY: ICD-10-CM

## 2020-12-10 RX ORDER — MELOXICAM 15 MG/1
TABLET ORAL
Qty: 90 TAB | Refills: 3 | Status: SHIPPED | OUTPATIENT
Start: 2020-12-10 | End: 2022-03-22 | Stop reason: SDUPTHER

## 2021-01-10 DIAGNOSIS — I10 ESSENTIAL HYPERTENSION, BENIGN: ICD-10-CM

## 2021-01-11 RX ORDER — IRBESARTAN 150 MG/1
TABLET ORAL
Qty: 90 TAB | Refills: 3 | Status: SHIPPED | OUTPATIENT
Start: 2021-01-11 | End: 2022-02-22 | Stop reason: SDUPTHER

## 2021-01-14 DIAGNOSIS — E06.3 ACQUIRED AUTOIMMUNE HYPOTHYROIDISM: ICD-10-CM

## 2021-01-15 RX ORDER — LEVOTHYROXINE SODIUM 75 UG/1
TABLET ORAL
Qty: 90 TAB | Refills: 3 | Status: SHIPPED | OUTPATIENT
Start: 2021-01-15 | End: 2022-02-21 | Stop reason: SDUPTHER

## 2021-03-09 ENCOUNTER — TELEPHONE (OUTPATIENT)
Dept: INTERNAL MEDICINE CLINIC | Age: 46
End: 2021-03-09

## 2021-03-09 DIAGNOSIS — E78.2 MIXED HYPERLIPIDEMIA: Primary | ICD-10-CM

## 2021-03-09 DIAGNOSIS — E06.3 ACQUIRED AUTOIMMUNE HYPOTHYROIDISM: ICD-10-CM

## 2021-03-09 NOTE — TELEPHONE ENCOUNTER
Reviewed lab   - cholesterol improved, continue efforts with Diet and exercise , louis in 6 mos  - for inc psa, See urologist as directed - names given, he'll schedule

## 2021-06-17 DIAGNOSIS — E78.2 MIXED HYPERLIPIDEMIA: ICD-10-CM

## 2021-06-17 DIAGNOSIS — R97.20 INCREASED PROSTATE SPECIFIC ANTIGEN (PSA) VELOCITY: ICD-10-CM

## 2021-11-19 DIAGNOSIS — F41.1 ANXIETY STATE: ICD-10-CM

## 2021-11-21 RX ORDER — ESCITALOPRAM OXALATE 10 MG/1
TABLET ORAL
Qty: 90 TABLET | Refills: 0 | Status: SHIPPED | OUTPATIENT
Start: 2021-11-21 | End: 2022-02-02

## 2022-02-02 ENCOUNTER — OFFICE VISIT (OUTPATIENT)
Dept: INTERNAL MEDICINE CLINIC | Age: 47
End: 2022-02-02

## 2022-02-02 VITALS
RESPIRATION RATE: 16 BRPM | SYSTOLIC BLOOD PRESSURE: 117 MMHG | HEIGHT: 67 IN | BODY MASS INDEX: 25.27 KG/M2 | OXYGEN SATURATION: 98 % | WEIGHT: 161 LBS | DIASTOLIC BLOOD PRESSURE: 80 MMHG | HEART RATE: 75 BPM | TEMPERATURE: 97.5 F

## 2022-02-02 DIAGNOSIS — I10 ESSENTIAL HYPERTENSION, BENIGN: ICD-10-CM

## 2022-02-02 DIAGNOSIS — Z00.00 LABORATORY TESTS ORDERED AS PART OF A COMPLETE PHYSICAL EXAM (CPE): ICD-10-CM

## 2022-02-02 DIAGNOSIS — E06.3 ACQUIRED AUTOIMMUNE HYPOTHYROIDISM: Primary | ICD-10-CM

## 2022-02-02 DIAGNOSIS — F41.1 ANXIETY STATE: ICD-10-CM

## 2022-02-02 DIAGNOSIS — E78.2 MIXED HYPERLIPIDEMIA: ICD-10-CM

## 2022-02-02 PROCEDURE — 99214 OFFICE O/P EST MOD 30 MIN: CPT | Performed by: INTERNAL MEDICINE

## 2022-02-02 RX ORDER — ESCITALOPRAM OXALATE 5 MG/1
5 TABLET ORAL DAILY
Qty: 90 TABLET | Refills: 1 | Status: SHIPPED | OUTPATIENT
Start: 2022-02-02

## 2022-02-02 NOTE — PROGRESS NOTES
HISTORY OF PRESENT ILLNESS  Catrina Solano is a 48 y.o. male. HPI  Assessment:  Damian Pearce is seen today (voice file ends here)    Assessment:  Damian Pearce is seen today for follow up of hypertension and other medical problems. 1. Hypertension, stable on current regimen. 2. Hypothyroidism, due for lab recheck to assess stability of prescription medication treatment. 3. Anxiety, doing well, but he believes he is having some side effects of Lexapro. He tried 5 mg, but symptoms increased. He did not give it a very long trial, about three days. We reviewed a taper regimen. If it turns out he does need Lexapro, we will consider addition of Wellbutrin. This may negate some of the sexual side effects. Social history notable for him being happy with his work in [place]. He works one week on, one week off. Review of Systems   Constitutional: Negative for weight loss. Respiratory: Negative. Cardiovascular: Negative for chest pain, palpitations, leg swelling and PND. Genitourinary: Positive for frequency. See urologist as directed , not seen yet   Musculoskeletal: Negative for myalgias. Neurological: Negative for focal weakness. Physical Exam  Vitals and nursing note reviewed. Constitutional:       General: He is not in acute distress. Neck:      Vascular: No carotid bruit. Cardiovascular:      Rate and Rhythm: Normal rate and regular rhythm. Heart sounds: No murmur heard. No friction rub. No gallop. Pulmonary:      Effort: Pulmonary effort is normal. No respiratory distress. Breath sounds: Normal breath sounds. Musculoskeletal:      Right lower leg: No edema. Left lower leg: No edema. ASSESSMENT and PLAN  Diagnoses and all orders for this visit:    1. Acquired autoimmune hypothyroidism    2. Mixed hyperlipidemia    3. Essential hypertension, benign    4. Anxiety state  -     escitalopram oxalate (LEXAPRO) 5 mg tablet; Take 1 Tablet by mouth daily.  New dose    5. Laboratory tests ordered as part of a complete physical exam (CPE)  -     METABOLIC PANEL, COMPREHENSIVE; Future  -     CBC WITH AUTOMATED DIFF; Future  -     LIPID PANEL; Future  -     TSH 3RD GENERATION; Future  -     PSA SCREENING (SCREENING); Future  -     URINALYSIS W/ RFLX MICROSCOPIC;  Future    Other orders  -     MICROSCOPIC EXAMINATION

## 2022-02-02 NOTE — PROGRESS NOTES
Verified name and birth date for privacy precautions. Chart reviewed in preparation for today's visit. Chief Complaint   Patient presents with    Thyroid Problem          Health Maintenance Due   Topic    Hepatitis C Screening     Depression Screen          Wt Readings from Last 3 Encounters:   02/02/22 161 lb (73 kg)   11/26/19 162 lb 6.4 oz (73.7 kg)   03/04/19 168 lb 8 oz (76.4 kg)     Temp Readings from Last 3 Encounters:   02/02/22 97.5 °F (36.4 °C) (Temporal)   11/26/19 98.4 °F (36.9 °C)   03/04/19 98 °F (36.7 °C) (Oral)     BP Readings from Last 3 Encounters:   02/02/22 117/80   11/26/19 129/85   03/04/19 106/78     Pulse Readings from Last 3 Encounters:   02/02/22 75   11/26/19 76   03/04/19 74         Learning Assessment:  :     Learning Assessment 4/13/2015   PRIMARY LEARNER Patient   HIGHEST LEVEL OF EDUCATION - PRIMARY LEARNER  > 4 YEARS OF COLLEGE   BARRIERS PRIMARY LEARNER NONE   PRIMARY LANGUAGE ENGLISH   LEARNER PREFERENCE PRIMARY READING   ANSWERED BY Patient   RELATIONSHIP SELF       Depression Screening:  :     3 most recent PHQ Screens 2/2/2022   Little interest or pleasure in doing things Not at all   Feeling down, depressed, irritable, or hopeless Not at all   Total Score PHQ 2 0       Fall Risk Assessment:  :     No flowsheet data found. Abuse Screening:  :     Abuse Screening Questionnaire 2/2/2022   Do you ever feel afraid of your partner? N   Are you in a relationship with someone who physically or mentally threatens you? N   Is it safe for you to go home?  Daryle Cypher

## 2022-02-05 LAB
ALBUMIN SERPL-MCNC: 4.3 G/DL (ref 4–5)
ALBUMIN/GLOB SERPL: 1.9 {RATIO} (ref 1.2–2.2)
ALP SERPL-CCNC: 65 IU/L (ref 44–121)
ALT SERPL-CCNC: 12 IU/L (ref 0–44)
APPEARANCE UR: CLEAR
AST SERPL-CCNC: 16 IU/L (ref 0–40)
BACTERIA #/AREA URNS HPF: NORMAL /[HPF]
BASOPHILS # BLD AUTO: 0 X10E3/UL (ref 0–0.2)
BASOPHILS NFR BLD AUTO: 1 %
BILIRUB SERPL-MCNC: 0.5 MG/DL (ref 0–1.2)
BILIRUB UR QL STRIP: NEGATIVE
BUN SERPL-MCNC: 15 MG/DL (ref 6–24)
BUN/CREAT SERPL: 16 (ref 9–20)
CALCIUM SERPL-MCNC: 9.4 MG/DL (ref 8.7–10.2)
CASTS URNS QL MICRO: NORMAL /LPF
CHLORIDE SERPL-SCNC: 102 MMOL/L (ref 96–106)
CHOLEST SERPL-MCNC: 206 MG/DL (ref 100–199)
CO2 SERPL-SCNC: 28 MMOL/L (ref 20–29)
COLOR UR: YELLOW
CREAT SERPL-MCNC: 0.94 MG/DL (ref 0.76–1.27)
EOSINOPHIL # BLD AUTO: 0.1 X10E3/UL (ref 0–0.4)
EOSINOPHIL NFR BLD AUTO: 2 %
EPI CELLS #/AREA URNS HPF: NORMAL /HPF (ref 0–10)
ERYTHROCYTE [DISTWIDTH] IN BLOOD BY AUTOMATED COUNT: 12.7 % (ref 11.6–15.4)
GLOBULIN SER CALC-MCNC: 2.3 G/DL (ref 1.5–4.5)
GLUCOSE SERPL-MCNC: 88 MG/DL (ref 65–99)
GLUCOSE UR QL STRIP: NEGATIVE
HCT VFR BLD AUTO: 46.7 % (ref 37.5–51)
HDLC SERPL-MCNC: 49 MG/DL
HGB BLD-MCNC: 15.8 G/DL (ref 13–17.7)
HGB UR QL STRIP: NEGATIVE
IMM GRANULOCYTES # BLD AUTO: 0 X10E3/UL (ref 0–0.1)
IMM GRANULOCYTES NFR BLD AUTO: 1 %
KETONES UR QL STRIP: ABNORMAL
LDLC SERPL CALC-MCNC: 143 MG/DL (ref 0–99)
LEUKOCYTE ESTERASE UR QL STRIP: NEGATIVE
LYMPHOCYTES # BLD AUTO: 1.3 X10E3/UL (ref 0.7–3.1)
LYMPHOCYTES NFR BLD AUTO: 38 %
MCH RBC QN AUTO: 32.2 PG (ref 26.6–33)
MCHC RBC AUTO-ENTMCNC: 33.8 G/DL (ref 31.5–35.7)
MCV RBC AUTO: 95 FL (ref 79–97)
MICRO URNS: ABNORMAL
MONOCYTES # BLD AUTO: 0.6 X10E3/UL (ref 0.1–0.9)
MONOCYTES NFR BLD AUTO: 17 %
NEUTROPHILS # BLD AUTO: 1.4 X10E3/UL (ref 1.4–7)
NEUTROPHILS NFR BLD AUTO: 41 %
NITRITE UR QL STRIP: NEGATIVE
PH UR STRIP: 6.5 [PH] (ref 5–7.5)
PLATELET # BLD AUTO: 175 X10E3/UL (ref 150–450)
POTASSIUM SERPL-SCNC: 4.3 MMOL/L (ref 3.5–5.2)
PROT SERPL-MCNC: 6.6 G/DL (ref 6–8.5)
PROT UR QL STRIP: ABNORMAL
PSA SERPL-MCNC: 4.4 NG/ML (ref 0–4)
RBC # BLD AUTO: 4.9 X10E6/UL (ref 4.14–5.8)
RBC #/AREA URNS HPF: NORMAL /HPF (ref 0–2)
SODIUM SERPL-SCNC: 143 MMOL/L (ref 134–144)
SP GR UR STRIP: 1.03 (ref 1–1.03)
TRIGL SERPL-MCNC: 75 MG/DL (ref 0–149)
TSH SERPL DL<=0.005 MIU/L-ACNC: 2.36 UIU/ML (ref 0.45–4.5)
UROBILINOGEN UR STRIP-MCNC: 0.2 MG/DL (ref 0.2–1)
VLDLC SERPL CALC-MCNC: 14 MG/DL (ref 5–40)
WBC # BLD AUTO: 3.4 X10E3/UL (ref 3.4–10.8)
WBC #/AREA URNS HPF: NORMAL /HPF (ref 0–5)

## 2022-02-21 DIAGNOSIS — E06.3 ACQUIRED AUTOIMMUNE HYPOTHYROIDISM: ICD-10-CM

## 2022-02-21 RX ORDER — LEVOTHYROXINE SODIUM 75 UG/1
TABLET ORAL
Qty: 30 TABLET | Refills: 3 | Status: SHIPPED | OUTPATIENT
Start: 2022-02-21 | End: 2022-02-22 | Stop reason: SDUPTHER

## 2022-02-22 DIAGNOSIS — I10 ESSENTIAL HYPERTENSION, BENIGN: ICD-10-CM

## 2022-02-22 DIAGNOSIS — E06.3 ACQUIRED AUTOIMMUNE HYPOTHYROIDISM: ICD-10-CM

## 2022-02-22 RX ORDER — LEVOTHYROXINE SODIUM 75 UG/1
TABLET ORAL
Qty: 90 TABLET | Refills: 3 | Status: SHIPPED | OUTPATIENT
Start: 2022-02-22

## 2022-02-22 RX ORDER — IRBESARTAN 150 MG/1
TABLET ORAL
Qty: 90 TABLET | Refills: 3 | Status: SHIPPED | OUTPATIENT
Start: 2022-02-22

## 2022-03-19 PROBLEM — K58.0 IRRITABLE BOWEL SYNDROME WITH DIARRHEA: Status: ACTIVE | Noted: 2017-05-15

## 2022-03-22 RX ORDER — MELOXICAM 15 MG/1
TABLET ORAL
Qty: 90 TABLET | Refills: 3 | Status: SHIPPED | OUTPATIENT
Start: 2022-03-22

## 2023-01-31 ENCOUNTER — TELEPHONE (OUTPATIENT)
Dept: INTERNAL MEDICINE CLINIC | Age: 48
End: 2023-01-31

## 2023-01-31 NOTE — TELEPHONE ENCOUNTER
----- Message from [place] sent at 1/31/2023 11:13 AM EST -----  Subject: Message to Provider    QUESTIONS  Information for Provider? Pt. called and said he is leaving practice and   he has found another PCP.  ---------------------------------------------------------------------------  --------------  [facility]  [phone]; OK to leave message on voicemail  ---------------------------------------------------------------------------  --------------  SCRIPT ANSWERS  Relationship to Patient?  Self

## 2023-08-01 ENCOUNTER — HOSPITAL ENCOUNTER (OUTPATIENT)
Facility: HOSPITAL | Age: 48
Discharge: HOME | End: 2023-08-04

## 2023-08-01 VITALS — HEIGHT: 67 IN | WEIGHT: 155 LBS | BODY MASS INDEX: 24.33 KG/M2

## 2023-08-01 RX ORDER — TADALAFIL 5 MG/1
5 TABLET ORAL DAILY
COMMUNITY
Start: 2023-06-20